# Patient Record
Sex: MALE | Race: WHITE | URBAN - METROPOLITAN AREA
[De-identification: names, ages, dates, MRNs, and addresses within clinical notes are randomized per-mention and may not be internally consistent; named-entity substitution may affect disease eponyms.]

---

## 2017-02-23 ENCOUNTER — TELEPHONE (OUTPATIENT)
Dept: FAMILY MEDICINE | Facility: CLINIC | Age: 27
End: 2017-02-23

## 2017-02-23 NOTE — TELEPHONE ENCOUNTER
----- Message from Chika Weaver sent at 2/22/2017  3:54 PM CST -----  Contact: Self/[phone] cell  Pt is calling to request a copy of his immunizations. He would like to speak with someone in the office. Please call and advise.    Thank you!

## 2017-03-07 ENCOUNTER — TELEPHONE (OUTPATIENT)
Dept: FAMILY MEDICINE | Facility: CLINIC | Age: 27
End: 2017-03-07

## 2017-03-07 NOTE — TELEPHONE ENCOUNTER
Attempted to contact patient at number provided. Unable to leave a message because the voicemail has not been set up.

## 2017-03-07 NOTE — TELEPHONE ENCOUNTER
----- Message from Payton Gómez sent at 3/6/2017  8:03 AM CST -----  Contact: self/[phone]  Pt called in regards to having questions about  a vaccination letter he received.        Please advise